# Patient Record
Sex: FEMALE | Race: WHITE | Employment: OTHER | URBAN - METROPOLITAN AREA
[De-identification: names, ages, dates, MRNs, and addresses within clinical notes are randomized per-mention and may not be internally consistent; named-entity substitution may affect disease eponyms.]

---

## 2023-10-31 RX ORDER — POTASSIUM CITRATE 100 %
GRANULES (GRAM) MISCELLANEOUS
COMMUNITY
Start: 2019-04-11

## 2023-10-31 RX ORDER — CALCIUM CARBONATE/VITAMIN D3 500-10/5ML
LIQUID (ML) ORAL
COMMUNITY
Start: 2019-04-11

## 2023-10-31 RX ORDER — CALCIUM CARBONATE 200(500)MG
TABLET,CHEWABLE ORAL
COMMUNITY
Start: 2019-04-11

## 2023-10-31 RX ORDER — MULTIVIT-MIN/IRON/FOLIC ACID/K 45-800-120
CAPSULE ORAL
COMMUNITY
Start: 2019-04-11

## 2023-10-31 RX ORDER — CHOLECALCIFEROL (VITAMIN D3) 10(400)/ML
DROPS ORAL
COMMUNITY
Start: 2019-04-11

## 2023-10-31 RX ORDER — DIAPER,BRIEF,ADULT, DISPOSABLE
EACH MISCELLANEOUS
COMMUNITY
Start: 2019-04-11

## 2023-11-01 ENCOUNTER — TELEPHONE (OUTPATIENT)
Dept: RHEUMATOLOGY | Facility: CLINIC | Age: 68
End: 2023-11-01

## 2023-11-01 ENCOUNTER — OFFICE VISIT (OUTPATIENT)
Dept: RHEUMATOLOGY | Facility: CLINIC | Age: 68
End: 2023-11-01
Payer: MEDICARE

## 2023-11-01 ENCOUNTER — LAB (OUTPATIENT)
Dept: LAB | Facility: LAB | Age: 68
End: 2023-11-01
Payer: MEDICARE

## 2023-11-01 VITALS
HEART RATE: 73 BPM | DIASTOLIC BLOOD PRESSURE: 70 MMHG | WEIGHT: 116 LBS | BODY MASS INDEX: 21.35 KG/M2 | SYSTOLIC BLOOD PRESSURE: 105 MMHG | TEMPERATURE: 97.9 F | HEIGHT: 62 IN

## 2023-11-01 DIAGNOSIS — M81.0 OSTEOPOROSIS, UNSPECIFIED OSTEOPOROSIS TYPE, UNSPECIFIED PATHOLOGICAL FRACTURE PRESENCE: Primary | ICD-10-CM

## 2023-11-01 DIAGNOSIS — M81.0 OSTEOPOROSIS, UNSPECIFIED OSTEOPOROSIS TYPE, UNSPECIFIED PATHOLOGICAL FRACTURE PRESENCE: ICD-10-CM

## 2023-11-01 DIAGNOSIS — E07.9 THYROID CONDITION: ICD-10-CM

## 2023-11-01 LAB
25(OH)D3 SERPL-MCNC: 107 NG/ML (ref 30–100)
ALBUMIN SERPL BCP-MCNC: 4.3 G/DL (ref 3.4–5)
ALP SERPL-CCNC: 68 U/L (ref 33–136)
ALT SERPL W P-5'-P-CCNC: 20 U/L (ref 7–45)
ANION GAP SERPL CALC-SCNC: 12 MMOL/L (ref 10–20)
AST SERPL W P-5'-P-CCNC: 24 U/L (ref 9–39)
BILIRUB SERPL-MCNC: 0.8 MG/DL (ref 0–1.2)
BUN SERPL-MCNC: 17 MG/DL (ref 6–23)
CALCIUM SERPL-MCNC: 9.4 MG/DL (ref 8.6–10.3)
CHLORIDE SERPL-SCNC: 102 MMOL/L (ref 98–107)
CO2 SERPL-SCNC: 29 MMOL/L (ref 21–32)
CREAT SERPL-MCNC: 0.86 MG/DL (ref 0.5–1.05)
GFR SERPL CREATININE-BSD FRML MDRD: 73 ML/MIN/1.73M*2
GLUCOSE SERPL-MCNC: 85 MG/DL (ref 74–99)
MAGNESIUM SERPL-MCNC: 1.98 MG/DL (ref 1.6–2.4)
PHOSPHATE SERPL-MCNC: 4.6 MG/DL (ref 2.5–4.9)
POTASSIUM SERPL-SCNC: 4.3 MMOL/L (ref 3.5–5.3)
PROT SERPL-MCNC: 6.7 G/DL (ref 6.4–8.2)
SODIUM SERPL-SCNC: 139 MMOL/L (ref 136–145)
TSH SERPL-ACNC: 2.51 MIU/L (ref 0.44–3.98)

## 2023-11-01 PROCEDURE — 36415 COLL VENOUS BLD VENIPUNCTURE: CPT

## 2023-11-01 PROCEDURE — 1126F AMNT PAIN NOTED NONE PRSNT: CPT | Performed by: STUDENT IN AN ORGANIZED HEALTH CARE EDUCATION/TRAINING PROGRAM

## 2023-11-01 PROCEDURE — 1036F TOBACCO NON-USER: CPT | Performed by: STUDENT IN AN ORGANIZED HEALTH CARE EDUCATION/TRAINING PROGRAM

## 2023-11-01 PROCEDURE — 80053 COMPREHEN METABOLIC PANEL: CPT

## 2023-11-01 PROCEDURE — 99214 OFFICE O/P EST MOD 30 MIN: CPT | Performed by: STUDENT IN AN ORGANIZED HEALTH CARE EDUCATION/TRAINING PROGRAM

## 2023-11-01 PROCEDURE — 84078 ASSAY ALKALINE PHOSPHATASE: CPT

## 2023-11-01 PROCEDURE — 82306 VITAMIN D 25 HYDROXY: CPT

## 2023-11-01 PROCEDURE — 84207 ASSAY OF VITAMIN B-6: CPT

## 2023-11-01 PROCEDURE — 1159F MED LIST DOCD IN RCRD: CPT | Performed by: STUDENT IN AN ORGANIZED HEALTH CARE EDUCATION/TRAINING PROGRAM

## 2023-11-01 PROCEDURE — 83970 ASSAY OF PARATHORMONE: CPT

## 2023-11-01 PROCEDURE — 86258 DGP ANTIBODY EACH IG CLASS: CPT

## 2023-11-01 PROCEDURE — 86364 TISS TRNSGLTMNASE EA IG CLAS: CPT

## 2023-11-01 PROCEDURE — 83735 ASSAY OF MAGNESIUM: CPT

## 2023-11-01 PROCEDURE — 1160F RVW MEDS BY RX/DR IN RCRD: CPT | Performed by: STUDENT IN AN ORGANIZED HEALTH CARE EDUCATION/TRAINING PROGRAM

## 2023-11-01 PROCEDURE — 84100 ASSAY OF PHOSPHORUS: CPT

## 2023-11-01 PROCEDURE — 84443 ASSAY THYROID STIM HORMONE: CPT

## 2023-11-01 RX ORDER — LANOLIN ALCOHOL/MO/W.PET/CERES
500 CREAM (GRAM) TOPICAL DAILY
COMMUNITY

## 2023-11-01 RX ORDER — TOBRAMYCIN AND DEXAMETHASONE 3; 1 MG/ML; MG/ML
SUSPENSION/ DROPS OPHTHALMIC
COMMUNITY
Start: 2023-04-10

## 2023-11-01 NOTE — PATIENT INSTRUCTIONS
-Advised to optimize bone health by increasing calcium and vitamin D dietary and supplementary intake  -Daily calcium requirement is about 1200mg/day. Daily vitamin D requirement is about 800 units.  Optimal intake can be achieved with a combination of diet plus supplements, although we prefer that at least half come from dietary sources. Dairy products have the highest calcium content per serving (Milk, yogurt and cheese). Orange juice is a good source for calcium. Additional sources include but not limited to beans, dark, leafy green vegetables and almonds. Regarding vitamin D, commercially fortified milk is the largest source of dietary vitamin D, in addition to cod liver oil.   -Sunlight exposure  for better vitamin D absorption and exercising for bone health are crucial.

## 2023-11-01 NOTE — PROGRESS NOTES
Rheumatology New Outpatient  Note    Subjective   Denise Engelmann is a 68 y.o. female presenting today for Osteoporosis.    History of Presenting Problem:     She was diagnosed with cervical spondylosis, scoliosis, osteoporosis for 20 years and tried fosamax, has  boniva for 4-5 years without significant improvement then she moved to [facility] for ~2 years and has been off any antiresorptive for ~10-15 years.     She is following with orthodontics because of ?thin jaw bone.. He is concerned about avascular necrosis of the jaw but also recommends to proceed with Prolia if there are no alternatives.     She broke her wrist 15 years ago.     Risk factors for osteoporosis: advanced age, previous fracture, extensive family h/o osteoporosis on moms side, low body weight      Past Medical History:   Past Medical History:   Diagnosis Date    Personal history of other diseases of the musculoskeletal system and connective tissue 05/06/2019    History of scoliosis    Personal history of other diseases of the musculoskeletal system and connective tissue 05/06/2019    History of osteoporosis       Allergies: Not on File    Medications:   Current Outpatient Medications:     calcium carbonate (Tums) 200 mg calcium chewable tablet, Chew., Disp: , Rfl:     cholecalciferol (Vitamin D-3) 400 units/mL drops, Take by mouth., Disp: , Rfl:     fish oil concentrate (Omega-3) 120-180 mg capsule, Take by mouth., Disp: , Rfl:     lysine 500 mg tablet, Take by mouth., Disp: , Rfl:     magnesium oxide 400 mg magnesium capsule, Take by mouth., Disp: , Rfl:     multivitamin-min-iron-FA-vit K (Bariatric Multivitamins) 45 mg iron- 800 mcg-120 mcg capsule, Take by mouth., Disp: , Rfl:     potassium citrate, bulk, 100 % granules, Potassium Citrate Granules  Refills: 0      Start : 11-Apr-2019  Active, Disp: , Rfl:     Review of Systems:   Constitutional: Denies fever, chills   Eyes: Denies dry eyes, pain in the eyes   ENT: Denies dry mouth, loss of  taste, sores in the mouth  Cardiovascular: Denies chest pain, palpitations   Respiratory: Denies shortness of breath   Gastrointestinal: Denies heartburn   Integumentary: Denies photosensitivity, rash or lesions, Raynaud's   Neurological: Denies any numbness or tingling    MSK: As per HPI.     All 10 review of systems have been reviewed and are negative for complaint except as noted in the HPI    Objective   Physical Examination:  There were no vitals filed for this visit.  Growth %ile SmartLinks can only be used for patients less than 20 years old.  Ht Readings from Last 1 Encounters:   No data found for Ht     Wt Readings from Last 1 Encounters:   12/23/22 50.3 kg (111 lb)       General - NAD, sitting up in chair, well-groomed, pleasant, AAOx3  Head: Normocephalic, atraumatic  Eyes - PERRLA, EOMI. No conjunctiva injection.   Lungs - Symmetric chest expansion.   Skin - No rashes or ulcers. Skin warm and dry. No erythema on bilateral cheeks.  Extremities - No edema, cyanosis ,or clubbing  Neurological - Alert and oriented x 3,  grossly intact. No focal deficit.        Assessment/Plan   Denise Engelmann is a 68 y.o. female with PMHx of osteoporosis, cervical spondylosis, scoliosis,  who presenting today as a NP for discussion of treatment options:    ##Osteoporosis:  -Risk factors for osteoporosis: advanced age, previous fracture, extensive family h/o osteoporosis on moms side, low body weight  -Labs to be updated  -Most recent DEXA scan: obtain records from PCP  -Patient is encouraged to increase dietary calcium intake to 1200 mg per day and vitamin D to 800 units daily  -Patient is encouraged to take supplemental calcium(Calcium citrate if PPI), and vitamin D  -Drug therapy needed: attempt to approve Evenity given her jaw problems. If not approved then will proceed with Prolia (discuss with orthodontics)  -Encouraged to exercise for 30 minutes daily for 3-5 days per week  -Limit caffeine intake to 2-3 servings per  day  -Advised to avoid smoking and alcohol intake    The assessment and plan, risk and benefits were discussed with the patient. All of the patients questions were answered and patient agrees to the plan.      Rey Jones MD  Clinical   Department of Rheumatology   [facility]

## 2023-11-02 LAB
GLIADIN PEPTIDE IGA SER IA-ACNC: <1 U/ML
GLIADIN PEPTIDE IGG SER IA-ACNC: <1 U/ML
PTH-INTACT SERPL-MCNC: 19.5 PG/ML (ref 18.5–88)
TTG IGA SER IA-ACNC: <1 U/ML
TTG IGG SER IA-ACNC: <1 U/ML

## 2023-11-03 LAB — ALP BONE SERPL-MCNC: 12.2 UG/L

## 2023-11-06 LAB — PYRIDOXAL PHOS SERPL-SCNC: 497.4 NMOL/L (ref 20–125)

## 2023-11-15 NOTE — PROGRESS NOTES
Rheumatology Outpatient Follow up Note    Subjective   Denise Engelmann is a 68 y.o. female presenting today for No chief complaint on file..    History of Presenting Problem:     Rheum history: She was diagnosed with cervical spondylosis, scoliosis, osteoporosis for 20 years and tried fosamax, has  boniva for 4-5 years without significant improvement then she moved to [facility] for ~2 years and has been off any antiresorptive for ~10-15 years.   She is following with orthodontics because of ?thin jaw bone.. He is concerned about avascular necrosis of the jaw but also recommends to proceed with Prolia if there are no alternatives. She broke her wrist 15 years ago.  Risk factors for osteoporosis: advanced age, previous fracture, extensive family h/o osteoporosis on moms side, low body weight    Interval history: ***    Past Medical History:   Past Medical History:   Diagnosis Date    Personal history of other diseases of the musculoskeletal system and connective tissue 05/06/2019    History of scoliosis    Personal history of other diseases of the musculoskeletal system and connective tissue 05/06/2019    History of osteoporosis       Allergies: No Known Allergies    Medications:   Current Outpatient Medications:     ascorbic acid (Vitamin C) 500 mg ER capsule, Take 1 capsule (500 mg) by mouth once daily., Disp: , Rfl:     calcium carbonate (Tums) 200 mg calcium chewable tablet, Chew., Disp: , Rfl:     cholecalciferol (Vitamin D-3) 400 units/mL drops, Take by mouth., Disp: , Rfl:     fish oil concentrate (Omega-3) 120-180 mg capsule, Take by mouth., Disp: , Rfl:     flavoring agent (GRAPEFRUIT MISC), Grapefruit extract, ORAL, DAILY, Date: 10/28/19 4:09:00 PM EDT, Supply, Disp: , Rfl:     lysine 500 mg tablet, Take by mouth., Disp: , Rfl:     magnesium oxide 400 mg magnesium capsule, Take by mouth., Disp: , Rfl:     multivitamin-min-iron-FA-vit K (Bariatric Multivitamins) 45 mg iron- 800 mcg-120 mcg capsule, Take by  "mouth., Disp: , Rfl:     potassium citrate, bulk, 100 % granules, Potassium Citrate Granules  Refills: 0      Start : 11-Apr-2019  Active, Disp: , Rfl:     tobramycin-dexamethasone (Tobradex) ophthalmic suspension, Apply one drop in the right eye three times daily for 5-7 days then stop. --- Shake first, Disp: , Rfl:     Review of Systems:   Constitutional: Denies fever, chills   Eyes: Denies dry eyes, pain in the eyes   ENT: Denies dry mouth, loss of taste, sores in the mouth  Cardiovascular: Denies chest pain, palpitations   Respiratory: Denies shortness of breath   Gastrointestinal: Denies heartburn   Integumentary: Denies photosensitivity, rash or lesions, Raynaud's   Neurological: Denies any numbness or tingling    MSK: As per HPI.     All 10 review of systems have been reviewed and are negative for complaint except as noted in the HPI    Objective   Physical Examination:  There were no vitals filed for this visit.  Growth %ile SmartLinks can only be used for patients less than 20 years old.  Ht Readings from Last 1 Encounters:   11/01/23 1.575 m (5' 2\")     Wt Readings from Last 1 Encounters:   11/01/23 52.6 kg (116 lb)       General - NAD, sitting up in chair, well-groomed, pleasant, AAOx3  Head: Normocephalic, atraumatic  Eyes - PERRLA, EOMI. No conjunctiva injection.   Lungs - Symmetric chest expansion.   Skin - No rashes or ulcers. Skin warm and dry. No erythema on bilateral cheeks.  Extremities - No edema, cyanosis ,or clubbing  Neurological - Alert and oriented x 3,  grossly intact. No focal deficit.        Assessment/Plan   Denise Engelmann is a 68 y.o. female with PMHx of osteoporosis, cervical spondylosis, scoliosis,  who presenting today as a f/u for discussion of treatment options:    ##Osteoporosis:  -Risk factors for osteoporosis: advanced age, previous fracture, extensive family h/o osteoporosis on moms side, low body weight  -Labs from 11/2023: Vitamin D 107**. CMP/CBC/TSH/PTH/celiac panel all " normal  -Most recent DEXA scan: obtain records from PCP***  -Patient is encouraged to increase dietary calcium intake to 1200 mg per day and vitamin D to 800 units daily  -Patient is encouraged to take supplemental calcium(Calcium citrate if PPI), and vitamin D  -Drug therapy needed: attempt to approve Evenity given her jaw problems. If not approved then will proceed with Prolia (discuss with orthodontics)  -Encouraged to exercise for 30 minutes daily for 3-5 days per week  -Limit caffeine intake to 2-3 servings per day  -Advised to avoid smoking and alcohol intake    The assessment and plan, risk and benefits were discussed with the patient. All of the patients questions were answered and patient agrees to the plan.      Rey Jones MD  Clinical   Department of Rheumatology   [facility]

## 2023-11-16 ENCOUNTER — APPOINTMENT (OUTPATIENT)
Dept: RHEUMATOLOGY | Facility: CLINIC | Age: 68
End: 2023-11-16
Payer: MEDICARE

## 2023-12-06 ENCOUNTER — APPOINTMENT (OUTPATIENT)
Dept: RHEUMATOLOGY | Facility: CLINIC | Age: 68
End: 2023-12-06
Payer: MEDICARE